# Patient Record
Sex: FEMALE | Race: AMERICAN INDIAN OR ALASKA NATIVE
[De-identification: names, ages, dates, MRNs, and addresses within clinical notes are randomized per-mention and may not be internally consistent; named-entity substitution may affect disease eponyms.]

---

## 2021-01-05 ENCOUNTER — HOSPITAL ENCOUNTER (OUTPATIENT)
Dept: HOSPITAL 5 - TRG | Age: 21
Discharge: HOME | End: 2021-01-05
Attending: OBSTETRICS & GYNECOLOGY
Payer: COMMERCIAL

## 2021-01-05 VITALS — DIASTOLIC BLOOD PRESSURE: 59 MMHG | SYSTOLIC BLOOD PRESSURE: 109 MMHG

## 2021-01-05 DIAGNOSIS — Z3A.30: ICD-10-CM

## 2021-01-05 DIAGNOSIS — O16.3: ICD-10-CM

## 2021-01-05 PROCEDURE — 59025 FETAL NON-STRESS TEST: CPT

## 2021-02-04 ENCOUNTER — HOSPITAL ENCOUNTER (OUTPATIENT)
Dept: HOSPITAL 5 - TRG | Age: 21
Discharge: HOME | End: 2021-02-04
Attending: OBSTETRICS & GYNECOLOGY
Payer: COMMERCIAL

## 2021-02-04 VITALS — DIASTOLIC BLOOD PRESSURE: 62 MMHG | SYSTOLIC BLOOD PRESSURE: 112 MMHG

## 2021-02-04 DIAGNOSIS — R10.9: ICD-10-CM

## 2021-02-04 DIAGNOSIS — O26.893: ICD-10-CM

## 2021-02-04 DIAGNOSIS — Z3A.34: ICD-10-CM

## 2021-02-04 DIAGNOSIS — O26.853: Primary | ICD-10-CM

## 2021-02-04 LAB
BILIRUB UR QL STRIP: (no result)
BLOOD UR QL VISUAL: (no result)
MUCOUS THREADS #/AREA URNS HPF: (no result) /HPF
PH UR STRIP: 6 [PH] (ref 5–7)
PROT UR STRIP-MCNC: (no result) MG/DL
RBC #/AREA URNS HPF: < 1 /HPF (ref 0–6)
UROBILINOGEN UR-MCNC: 2 MG/DL (ref ?–2)
WBC #/AREA URNS HPF: < 1 /HPF (ref 0–6)

## 2021-02-04 PROCEDURE — 59025 FETAL NON-STRESS TEST: CPT

## 2021-02-04 PROCEDURE — 81001 URINALYSIS AUTO W/SCOPE: CPT

## 2021-02-24 ENCOUNTER — HOSPITAL ENCOUNTER (OUTPATIENT)
Dept: HOSPITAL 5 - TRG | Age: 21
Setting detail: OBSERVATION
LOS: 1 days | Discharge: HOME | End: 2021-02-25
Attending: OBSTETRICS & GYNECOLOGY | Admitting: OBSTETRICS & GYNECOLOGY
Payer: COMMERCIAL

## 2021-02-24 DIAGNOSIS — O42.92: Primary | ICD-10-CM

## 2021-02-24 DIAGNOSIS — Z3A.37: ICD-10-CM

## 2021-02-24 PROCEDURE — 36415 COLL VENOUS BLD VENIPUNCTURE: CPT

## 2021-02-24 PROCEDURE — 76815 OB US LIMITED FETUS(S): CPT

## 2021-02-24 PROCEDURE — 59025 FETAL NON-STRESS TEST: CPT

## 2021-02-24 PROCEDURE — 76819 FETAL BIOPHYS PROFIL W/O NST: CPT

## 2021-02-24 PROCEDURE — G0378 HOSPITAL OBSERVATION PER HR: HCPCS

## 2021-02-24 PROCEDURE — 96360 HYDRATION IV INFUSION INIT: CPT

## 2021-02-24 PROCEDURE — 84112 EVAL AMNIOTIC FLUID PROTEIN: CPT

## 2021-02-24 RX ADMIN — SODIUM CHLORIDE, SODIUM LACTATE, POTASSIUM CHLORIDE, AND CALCIUM CHLORIDE SCH MLS/HR: .6; .31; .03; .02 INJECTION, SOLUTION INTRAVENOUS at 18:33

## 2021-02-24 RX ADMIN — SODIUM CHLORIDE, SODIUM LACTATE, POTASSIUM CHLORIDE, AND CALCIUM CHLORIDE SCH MLS/HR: .6; .31; .03; .02 INJECTION, SOLUTION INTRAVENOUS at 16:40

## 2021-02-24 NOTE — ULTRASOUND REPORT
Limited OB Ultrasound

Biophysical profile



HISTORY: leaking fluid.



TECHNIQUE:  Grayscale and color  imaging performed.



COMPARISON:  None



FINDINGS: Single viable intrauterine gestation with cephalic presentation and DAMIEN of 8 cm. Heart rate
 is 141 bpm.



On biophysical profile, the fetus received a score of 2 out of 2 for breathing, movement, posture/ton
e, and DAMIEN. Total score was 8 out of 8.



IMPRESSION: 

1. Single viable intrauterine gestation as above.

2. Normal BPP.



Signer Name: Franc Cuevas MD 

Signed: 2/24/2021 4:09 PM

Workstation Name: [id number]

## 2021-02-24 NOTE — HISTORY AND PHYSICAL REPORT
History of Present Illness


Date of examination: 21


Date of admission: 


21 18:19





Chief complaint: 


Presents from office to r/o rupture; Dr. Mock performed exam in office and 

could not palpate a BOW, states nitrazene was Positive, but pooling was 

negative.  Patient denies leaking of fluid.





History of present illness: 


Early entry to prenatal care, prenatal course complicated by Vit. D Deficiency.








Past History


Past Medical History: no pertinent history





- Obstetrical History


Expected Date of Delivery: 21


Actual Gestation: 37 Week(s) 1 Day(s) 


: 2


Spontaneous Abortions: 1





Medications and Allergies


                                    Allergies











Allergy/AdvReac Type Severity Reaction Status Date / Time


 


No Known Allergies Allergy   Verified 21 11:38











                                Home Medications











 Medication  Instructions  Recorded  Confirmed  Last Taken  Type


 


No Known Home Medications [No  21 Unknown History





Reported Home Medications]     











Active Meds: 


Active Medications





Lactated Ringer's (Lactated Ringers)  1,000 mls @ 125 mls/hr IV AS DIRECT FLAVIO


   Last Admin: 21 18:33 Dose:  999 mls/hr


   Documented by: 


Terbutaline Sulfate (Terbutaline 1 Mg/1 Ml Inj)  0.25 mg SUB-Q ONCE PRN


   PRN Reason: Hyperstimulation/Hypertonicity











Review of Systems


All systems: negative





- Vital Signs


Vital signs: 


                                   Vital Signs











Pulse Ox


 


 98 


 


 21 12:37








                                        











Temp Pulse Resp BP Pulse Ox


 


 98.3 F   100 H  20   111/56   100 


 


 21 13:44  21 19:58  21 13:44  21 17:28  21 19:58














- Physical Exam


Breasts: Positive: normal


Cardiovascular: Regular rate


Lungs: Positive: Clear to auscultation, Normal air movement


Abdomen: Positive: normal appearance, soft, normal bowel sounds


Genitourinary (Female): Positive: normal external genitalia, normal perenium


Vagina: Positive: normal moisture


Uterus: Positive: enlarged


Anus/Rectum: Positive: normal perianal skin


Extremities: Positive: normal





- Obstetrical


FHR: category 1


Uterine Contraction Monitor Mode: External


Cervical Dilatation: 3 (no fluid leaking)


Cervical Effacement Percentage: 90


Fetal station: -2


Uterine Contraction Pattern: Irregular


Uterine Tone Measurement Phase: Resting


Uterine Contraction Intensity: Moderate





Results


All other labs normal.








Assessment and Plan


A: IUP @ 37 1/7 Weeks


     Category I Tracing


     Low DAMIEN


     GBS Negative





P: Admit to L&D for 24 hour Observation


    IV hydration


    Regular Diet 


    Bedrest with BRP


    Repeat BPP with DAMIEN in the AM

## 2021-02-24 NOTE — ULTRASOUND REPORT
Limited OB Ultrasound

Biophysical profile



HISTORY: leaking fluid.



TECHNIQUE:  Grayscale and color  imaging performed.



COMPARISON:  None



FINDINGS: Single viable intrauterine gestation with cephalic presentation and DAMIEN of 8 cm. Heart rate
 is 141 bpm.



On biophysical profile, the fetus received a score of 2 out of 2 for breathing, movement, posture/ton
e, and DAMIEN. Total score was 8 out of 8.



IMPRESSION: 

1. Single viable intrauterine gestation as above.

2. Normal BPP.



Signer Name: Franc Cuevas MD 

Signed: 2/24/2021 4:09 PM

Workstation Name: [id number]

## 2021-02-25 VITALS — SYSTOLIC BLOOD PRESSURE: 107 MMHG | DIASTOLIC BLOOD PRESSURE: 61 MMHG

## 2021-02-25 NOTE — ULTRASOUND REPORT
ULTRASOUND OBSTETRIC LIMITED 



INDICATION / CLINICAL INFORMATION: DAMIEN.



TECHNIQUE: Transabdominal ultrasound imaging.



COMPARISON: 2/24/2021



FINDINGS:



FETAL HEART RATE (beats per minute): 155 



AMNIOTIC FLUID INDEX (cm) =  7.3



PRESENTATION: Cephalic. 



ADDITIONAL FINDINGS: None.



IMPRESSION:

DAMIEN is at the lower limits of normal measuring 7.3



Signer Name: Zachariah Webb Jr, MD 

Signed: 2/25/2021 11:05 AM

Workstation Name: [id number]

## 2021-02-25 NOTE — EVENT NOTE
Date: 02/25/21





Pt resting in bed and admits to active FM. She denies uc or leakage of vag 

fluids. No fluid was noted in pt's  bed.  with mod tracie pos accels and no 

decels. Occ irreg uc SVE 3/80%/ -3. BPP was 8/8 yesterday with an DAMIEN of 8. 

Amnisure was neg.  Today her her DAMIEN is 7.3 and Amnisure is still neg. After 

consulting with Dr Costa, pt was d/c'd home with instructions.

## 2021-03-02 ENCOUNTER — HOSPITAL ENCOUNTER (OUTPATIENT)
Dept: HOSPITAL 5 - TRG | Age: 21
Discharge: HOME | End: 2021-03-02
Attending: OBSTETRICS & GYNECOLOGY
Payer: COMMERCIAL

## 2021-03-02 VITALS — SYSTOLIC BLOOD PRESSURE: 118 MMHG | DIASTOLIC BLOOD PRESSURE: 69 MMHG

## 2021-03-02 DIAGNOSIS — Z3A.38: ICD-10-CM

## 2021-03-02 DIAGNOSIS — Z34.93: Primary | ICD-10-CM

## 2021-03-02 LAB
BACTERIA #/AREA URNS HPF: (no result) /HPF
BILIRUB UR QL STRIP: (no result)
BLOOD UR QL VISUAL: (no result)
CAOX CRY #/AREA URNS HPF: (no result) /[HPF]
MUCOUS THREADS #/AREA URNS HPF: (no result) /HPF
PH UR STRIP: 5 [PH] (ref 5–7)
RBC #/AREA URNS HPF: 3 /HPF (ref 0–6)
UROBILINOGEN UR-MCNC: 4 MG/DL (ref ?–2)
WBC #/AREA URNS HPF: 2 /HPF (ref 0–6)

## 2021-03-02 PROCEDURE — 59025 FETAL NON-STRESS TEST: CPT

## 2021-03-02 PROCEDURE — 81001 URINALYSIS AUTO W/SCOPE: CPT

## 2021-03-05 ENCOUNTER — HOSPITAL ENCOUNTER (OUTPATIENT)
Dept: HOSPITAL 5 - TRG | Age: 21
Discharge: HOME | End: 2021-03-05
Attending: OBSTETRICS & GYNECOLOGY
Payer: COMMERCIAL

## 2021-03-05 VITALS — SYSTOLIC BLOOD PRESSURE: 105 MMHG | DIASTOLIC BLOOD PRESSURE: 57 MMHG

## 2021-03-05 DIAGNOSIS — R03.0: ICD-10-CM

## 2021-03-05 DIAGNOSIS — O26.893: Primary | ICD-10-CM

## 2021-03-05 DIAGNOSIS — O47.1: ICD-10-CM

## 2021-03-05 DIAGNOSIS — Z3A.38: ICD-10-CM

## 2021-03-05 LAB
ALT SERPL-CCNC: 13 UNITS/L (ref 7–56)
BILIRUB UR QL STRIP: (no result)
BLOOD UR QL VISUAL: (no result)
HCT VFR BLD CALC: 44.7 % (ref 30.3–42.9)
HGB BLD-MCNC: 14.8 GM/DL (ref 10.1–14.3)
MCHC RBC AUTO-ENTMCNC: 33 % (ref 30–34)
MCV RBC AUTO: 97 FL (ref 79–97)
MUCOUS THREADS #/AREA URNS HPF: (no result) /HPF
PH UR STRIP: 6 [PH] (ref 5–7)
PLATELET # BLD: 227 K/MM3 (ref 140–440)
PROT UR STRIP-MCNC: (no result) MG/DL
RBC # BLD AUTO: 4.62 M/MM3 (ref 3.65–5.03)
RBC #/AREA URNS HPF: 2 /HPF (ref 0–6)
URATE SERPL-MCNC: 4 MG/DL (ref 3.5–7.6)
UROBILINOGEN UR-MCNC: < 2 MG/DL (ref ?–2)
WBC #/AREA URNS HPF: 2 /HPF (ref 0–6)

## 2021-03-05 PROCEDURE — 36415 COLL VENOUS BLD VENIPUNCTURE: CPT

## 2021-03-05 PROCEDURE — 84550 ASSAY OF BLOOD/URIC ACID: CPT

## 2021-03-05 PROCEDURE — 84450 TRANSFERASE (AST) (SGOT): CPT

## 2021-03-05 PROCEDURE — 81001 URINALYSIS AUTO W/SCOPE: CPT

## 2021-03-05 PROCEDURE — 84460 ALANINE AMINO (ALT) (SGPT): CPT

## 2021-03-05 PROCEDURE — 82565 ASSAY OF CREATININE: CPT

## 2021-03-05 PROCEDURE — 85027 COMPLETE CBC AUTOMATED: CPT

## 2021-03-05 PROCEDURE — 93005 ELECTROCARDIOGRAM TRACING: CPT

## 2021-03-05 PROCEDURE — 96360 HYDRATION IV INFUSION INIT: CPT

## 2021-03-05 PROCEDURE — 83615 LACTATE (LD) (LDH) ENZYME: CPT

## 2021-03-05 PROCEDURE — 59025 FETAL NON-STRESS TEST: CPT

## 2021-03-08 ENCOUNTER — HOSPITAL ENCOUNTER (INPATIENT)
Dept: HOSPITAL 5 - TRG | Age: 21
LOS: 2 days | Discharge: HOME | End: 2021-03-10
Attending: OBSTETRICS & GYNECOLOGY | Admitting: OBSTETRICS & GYNECOLOGY
Payer: COMMERCIAL

## 2021-03-08 DIAGNOSIS — J45.909: ICD-10-CM

## 2021-03-08 DIAGNOSIS — Z3A.38: ICD-10-CM

## 2021-03-08 LAB
HCT VFR BLD CALC: 40.9 % (ref 30.3–42.9)
HGB BLD-MCNC: 14.4 GM/DL (ref 10.1–14.3)
MCHC RBC AUTO-ENTMCNC: 35 % (ref 30–34)
MCV RBC AUTO: 90 FL (ref 79–97)
PLATELET # BLD: 239 K/MM3 (ref 140–440)
RBC # BLD AUTO: 4.53 M/MM3 (ref 3.65–5.03)

## 2021-03-08 PROCEDURE — 81001 URINALYSIS AUTO W/SCOPE: CPT

## 2021-03-08 PROCEDURE — A6250 SKIN SEAL PROTECT MOISTURIZR: HCPCS

## 2021-03-08 PROCEDURE — 85014 HEMATOCRIT: CPT

## 2021-03-08 PROCEDURE — 96361 HYDRATE IV INFUSION ADD-ON: CPT

## 2021-03-08 PROCEDURE — 86901 BLOOD TYPING SEROLOGIC RH(D): CPT

## 2021-03-08 PROCEDURE — 84112 EVAL AMNIOTIC FLUID PROTEIN: CPT

## 2021-03-08 PROCEDURE — U0003 INFECTIOUS AGENT DETECTION BY NUCLEIC ACID (DNA OR RNA); SEVERE ACUTE RESPIRATORY SYNDROME CORONAVIRUS 2 (SARS-COV-2) (CORONAVIRUS DISEASE [COVID-19]), AMPLIFIED PROBE TECHNIQUE, MAKING USE OF HIGH THROUGHPUT TECHNOLOGIES AS DESCRIBED BY CMS-2020-01-R: HCPCS

## 2021-03-08 PROCEDURE — 36415 COLL VENOUS BLD VENIPUNCTURE: CPT

## 2021-03-08 PROCEDURE — 86900 BLOOD TYPING SEROLOGIC ABO: CPT

## 2021-03-08 PROCEDURE — 96374 THER/PROPH/DIAG INJ IV PUSH: CPT

## 2021-03-08 PROCEDURE — 85018 HEMOGLOBIN: CPT

## 2021-03-08 PROCEDURE — 96360 HYDRATION IV INFUSION INIT: CPT

## 2021-03-08 PROCEDURE — 59025 FETAL NON-STRESS TEST: CPT

## 2021-03-08 PROCEDURE — 86850 RBC ANTIBODY SCREEN: CPT

## 2021-03-08 PROCEDURE — 85027 COMPLETE CBC AUTOMATED: CPT

## 2021-03-08 PROCEDURE — 96365 THER/PROPH/DIAG IV INF INIT: CPT

## 2021-03-08 RX ADMIN — SODIUM CHLORIDE, SODIUM LACTATE, POTASSIUM CHLORIDE, AND CALCIUM CHLORIDE SCH MLS/HR: .6; .31; .03; .02 INJECTION, SOLUTION INTRAVENOUS at 23:30

## 2021-03-08 NOTE — HISTORY AND PHYSICAL REPORT
History of Present Illness


Date of examination: 21


Date of admission: 


3/8/21





Chief complaint: 


PROM at home at 6pm while sleeping


History of present illness: 


 at 38.6wk by LMP c/w U/S.  Pt c/o leakage of fluid while at home sleeping 

and then noticing a constant trickle of fluid.  pt admits to fetal movement, 

denies vaginal bleeding and denies feeling contractions and denies headache.





Past History


Past Medical History: asthma


Past Surgical History: no surgical history


Family/Genetic History: none


Social history: no significant social history





- Obstetrical History


Expected Date of Delivery: 21


Actual Gestation: 38 Week(s) 6 Day(s) 


: 2


Spontaneous Abortions: 1


Number of Living Children: 0





Medications and Allergies


                                    Allergies











Allergy/AdvReac Type Severity Reaction Status Date / Time


 


No Known Allergies Allergy   Verified 21 11:38











                                Home Medications











 Medication  Instructions  Recorded  Confirmed  Last Taken  Type


 


No Known Home Medications [No  21 Unknown History





Reported Home Medications]     











Active Meds: 


Active Medications





Ephedrine Sulfate (Ephedrine Sulfate 50 Mg/1 Ml Inj)  10 mg IV Q2M PRN


   PRN Reason: Hypotension


Fentanyl (Fentanyl 100 Mcg/2 Ml Inj)  100 mcg IV Q2H PRN


   PRN Reason: Pain,Severe (7-10) LABOR PAIN


Oxytocin/Sodium Chloride (Pitocin/Ns 30 Unit/500ml)  30 units in 500 mls @ 2 

mls/hr IV TITR FLAVIO; Protocol


Lactated Ringer's (Lactated Ringers)  1,000 mls @ 125 mls/hr IV AS DIRECT FLAVIO


Oxytocin/Sodium Chloride (Pitocin/Ns 30 Unit/500ml)  30 units in 500 mls @ 40 

mls/hr IV TITR FLAVIO; Protocol


Lidocaine (Lidocaine (2%) 20 Mg/1 Ml Vial 20 Ml Mdv)  20 ml INFILTRATI ONCE ONE


   Stop: 21 22:52


Mineral Oil (Mineral Oil 30 Ml Oral Liqd)  30 ml PO QHS PRN


   PRN Reason: Constipation


Nalbuphine HCl (Nalbuphine 10 Mg/1 Ml Inj)  10 mg IV Q2H PRN


   PRN Reason: Pain, Moderate (4-6)


Promethazine HCl (Promethazine 25 Mg Tab)  25 mg PO Q6H PRN


   PRN Reason: Nausea And Vomiting


Terbutaline Sulfate (Terbutaline 1 Mg/1 Ml Inj)  0.25 mg SUB-Q ONCE PRN


   PRN Reason: Hyperstimulation/Hypertonicity











Review of Systems


All systems: negative (leakage of fluid)





- Vital Signs


Vital signs: 


                                   Vital Signs











Temp Pulse Resp BP Pulse Ox


 


 98.5 F   110 H  20   110/63   97 


 


 21 21:42  21 21:42  21 21:42  21 21:42  21 21:42








                                        











Temp Pulse Resp BP Pulse Ox


 


 98.5 F   88   20   110/63   98 


 


 21 21:42  21 22:57  21 21:42  21 21:42  21 22:57














- Physical Exam


Breasts: Positive: deferred


Cardiovascular: Regular rate


Lungs: Positive: Normal air movement


Abdomen: Positive: normal appearance


Genitourinary (Female): Positive: normal external genitalia


Vulva: both: normal


Uterus: Positive: enlarged (non-tender gravid)


Extremities: Positive: normal





- Obstetrical


FHR: category 2 (previous reactive NST in triage)


Uterine Contraction Monitor Mode: External


Cervical Dilatation: 2.5 (clear fluid)


Cervical Effacement Percentage: 80


Fetal station: -1


Uterine Contraction Pattern: Irregular


Uterine Contraction Intensity: Moderate





Results


Result Diagrams: 


                                 21 22:20





                              Abnormal lab results











  21 Range/Units





  21:30 22:20 


 


Hgb   14.4 H  (10.1-14.3)  gm/dl


 


MCHC   35 H  (30-34)  %


 


Fetal Membranes Rupture  Positive A   (Negative)  








All other labs normal.








Assessment and Plan


 at 38.6wks with PROM, clear fluid in latent labor


1. ROM plus confirmed PROM, will admit pt to labor and deliver


2. Pitocin augmentation of labor after tracing reassuring


3. May have IV pain med or epidural


4. Will give IV fluids with current category II tracing


5. Discussed plan of care with pt with preferably vaginal delivery, and 

alternate  section if fetus not reassuring


6. Hopeful for vaginal delivery


All questions encouraged and answered

## 2021-03-09 PROCEDURE — 3E0R3BZ INTRODUCTION OF ANESTHETIC AGENT INTO SPINAL CANAL, PERCUTANEOUS APPROACH: ICD-10-PCS

## 2021-03-09 PROCEDURE — 00HU33Z INSERTION OF INFUSION DEVICE INTO SPINAL CANAL, PERCUTANEOUS APPROACH: ICD-10-PCS

## 2021-03-09 PROCEDURE — 10H07YZ INSERTION OF OTHER DEVICE INTO PRODUCTS OF CONCEPTION, VIA NATURAL OR ARTIFICIAL OPENING: ICD-10-PCS | Performed by: OBSTETRICS & GYNECOLOGY

## 2021-03-09 PROCEDURE — 3E033VJ INTRODUCTION OF OTHER HORMONE INTO PERIPHERAL VEIN, PERCUTANEOUS APPROACH: ICD-10-PCS | Performed by: OBSTETRICS & GYNECOLOGY

## 2021-03-09 RX ADMIN — SODIUM CHLORIDE, SODIUM LACTATE, POTASSIUM CHLORIDE, AND CALCIUM CHLORIDE SCH MLS/HR: .6; .31; .03; .02 INJECTION, SOLUTION INTRAVENOUS at 06:41

## 2021-03-09 RX ADMIN — IBUPROFEN SCH: 600 TABLET, FILM COATED ORAL at 18:32

## 2021-03-09 RX ADMIN — IBUPROFEN SCH: 600 TABLET, FILM COATED ORAL at 12:00

## 2021-03-09 NOTE — EVENT NOTE
Date: 21


Pt evaluated after receiving epidural.  Gandhi catheter placed by me.  IUPC 

placed without difficulty for adequacy of ctx with pelvic 4/100/-1 vtx.  FHR 

category I.  May give amnioinfusion if variables seen and nurse notified.  IV 

pitocin at 8mu/min.  Expect

## 2021-03-09 NOTE — ANESTHESIA CONSULTATION
Anesthesia Consult and Med Hx


Date of service: 03/09/21





- Airway


Anesthetic Teeth Evaluation: Good


ROM Head & Neck: Adequate


Mental/Hyoid Distance: Adequate


Mallampati Class: Class II


Intubation Access Assessment: Probably Good





- Pulmonary Exam


CTA: Yes





- Cardiac Exam


Cardiac Exam: RRR





- Pre-Operative Health Status


ASA Pre-Surgery Classification: ASA2


Proposed Anesthetic Plan: Epidural





- Pulmonary


Hx Asthma: Yes (last attack as a child)





- Cardiovascular System


Hx Hypertension: No





- Central Nervous System


Hx Seizures: No


Hx Psychiatric Problems: No





- Endocrine


Hx Renal Disease: No


Hx Hypothyroidism: No


Hx Hyperthyroidism: No





- Hematic


Hx Anemia: No


Hx Sickle Cell Disease: No





- Other Systems


Hx Alcohol Use: No

## 2021-03-09 NOTE — PROGRESS NOTE
Labor Epidural





- Labor Epidural


Start Time: 05:48


Stop Time: 05:54


Performed by:: MARIA DEL ROSARIO DEAL


Procedure: 


Patient is requesting epidural for labor pain.  H&P, and labs reviewed.  

Procedure explained, questions answered, consent obtained.  Patient in sitting 

position with blood pressure cuff and pulse ox on and working. Timeout performed

immediately before start of procedure.  Sterile chlorahexadine 0.5% prep/drape. 

3 mL 1% lidocaine skin wheal at L[3]-L[4].  18-gauge Tuohy epidural needle 

advanced to loss-of-resistance with saline at [7] cm.  Epidural dexmedetomidine 

[30] mcg administered. Epidural catheter advanced to [12] cm, negative 

aspiration for blood and csf, negative test dose 3 ml 1.5% lidocaine with 

epinephrine. Sterile steri-strips and tegaderm applied, followed by tape 

reinforcement. Patient tolerated procedure well.

## 2021-03-09 NOTE — PROCEDURE NOTE
OB Delivery Note





- Delivery


Date of Delivery: 21 (0907)


Surgeon: JANE SINGLETON (CNM)


Estimated blood loss: other (400cc)





- Vaginal


Delivery presentation: vertex


Delivery position: OA (CARINA)


Intrapartum events: none


Delivery induction: oxytocin


Delivery augmentation: rupture of membranes (SROM @ 1830, clear)


Delivery monitor: external FHT, internal uterine


Route of delivery: 


Delivery placenta: spontaneous (1103, tariq)


Delivery cord: 3 umbilical vessels


Episiotomy: none


Delivery laceration: none


Anesthesia: epidural


Delivery comments: 





 of viable crying female infant placed directly to maternal abdomen.  Cord 

double clamped, cut by FOB after cessation of pulsation.  Placenta spontaneously

delivered, disposed per hospital policy.  Uterus firm @ U-3.  Perineum intact.  

Mother and baby safe, stable and left in care of RN.





- Infant


  ** A


Apgar at 1 minute: 8


Apgar at 5 minutes: 9


Infant Gender: Female (Weight: 3129 gms (6lbs 14ozs) 19.5 inches)

## 2021-03-10 VITALS — DIASTOLIC BLOOD PRESSURE: 59 MMHG | SYSTOLIC BLOOD PRESSURE: 109 MMHG

## 2021-03-10 LAB
HCT VFR BLD CALC: 32.2 % (ref 30.3–42.9)
HGB BLD-MCNC: 11.2 GM/DL (ref 10.1–14.3)

## 2021-03-10 RX ADMIN — IBUPROFEN SCH: 600 TABLET, FILM COATED ORAL at 17:39

## 2021-03-10 RX ADMIN — IBUPROFEN SCH: 600 TABLET, FILM COATED ORAL at 00:00

## 2021-03-10 RX ADMIN — IBUPROFEN SCH MG: 600 TABLET, FILM COATED ORAL at 05:27

## 2021-03-10 RX ADMIN — IBUPROFEN SCH MG: 600 TABLET, FILM COATED ORAL at 11:46

## 2021-03-10 NOTE — PROGRESS NOTE
Assessment and Plan


A: Postpartum Day 1


    Stable





P: Follow routine postpartum orders


   Discharge to home today


   Return to clinic in 6 weeks for postpartum check





Subjective





- Subjective


Date of service: 03/10/21


Patient reports: appetite normal, voiding normally, pain well controlled, 

flatus, ambulating normally


: doing well, bottle feeding (and breastfeeding)





Objective





- Vital Signs


Latest vital signs: 


                                   Vital Signs











  Temp Pulse Resp BP BP Pulse Ox


 


 03/10/21 08:07  97.8 F  86  18  120/77   99


 


 03/10/21 03:36  98.5 F  96 H  20  130/71   99


 


 21 20:15  98.2 F  111 H  20  110/57   99


 


 21 15:56  98.7 F  83  18    100


 


 21 15:55   84   110/69   99


 


 21 14:30  98.1 F  72  18   115/63  100


 


 21 12:10   87     100


 


 21 12:06   78   105/56  


 


 21 12:05   84     100


 


 21 12:00   83     100


 


 21 11:55   90     100


 


 21 11:51   93 H   112/60  


 


 21 11:50   103 H     99


 


 21 11:45   99 H     100


 


 21 11:40   93 H     99


 


 21 11:36   87   111/55  


 


 21 11:35   99 H     99








                                Intake and Output











 03/09/21 03/10/21 03/10/21





 22:59 06:59 14:59


 


Intake Total 440 240 120


 


Balance 440 240 120


 


Intake:   


 


  Oral 440 240 120


 


Other:   


 


  Total, Intake Amount 320 240 120


 


  # Voids   


 


    Void  1 1














- Exam


Breasts: Present: normal


Cardiovascular: Present: Regular rate


Lungs: Present: Clear to auscultation, Normal air movement


Abdomen: Present: normal appearance, soft


Uterus: Present: normal, firm, fundal height below umbilicus


Extremities: Present: normal

## 2021-03-10 NOTE — DISCHARGE SUMMARY
Providers





- Providers


Date of Admission: 


21 02:31





Date of discharge: 03/10/21


Attending physician: 


SARINA MERCHANT





                                        





21 11:24


Consult to Lactation Consultant [CONS] Routine 


   Reason For Exam: assistance with breastfeeding, SNS











Primary care physician: 


SARINA MERCHANT








Hospitalization


Reason for admission: rupture of membranes


Delivery: 


Episiotomy: none


Laceration: none


Other postpartum procedures: none


Postpartum complications: none


Discharge diagnosis: IUP at term delivered


[place] baby: female


Condition at discharge: Good


Disposition: DC-01 TO HOME OR SELFCARE





Plan





- Provider Discharge Summary


Activity: routine, no sex for 6 weeks, no heavy lifting 4 weeks, no strenuous 

exercise


Diet: routine


Instructions: routine


Additional instructions: 


[]  Smoking cessation referral if applicable(refer to patient education folder 

for contact #)


[]  Refer to [facility] Booklet








Call your doctor immediately for:


* Fever > 100.5


* Heavy vaginal bleeding ( >1 pad per hour)


* Severe persistent headache


* Shortness of breath


* Reddened, hot, painful area to leg or breast


* Drainage or odor from incision.





* Keep incision clean and dry at all times and follow doctor's instructions 

regarding bathing/showering











- Follow up plan


Follow up: 


SARINA MERCHANT MD [Primary Care Provider] - 6 Weeks